# Patient Record
Sex: MALE | Race: WHITE | Employment: FULL TIME | URBAN - NONMETROPOLITAN AREA
[De-identification: names, ages, dates, MRNs, and addresses within clinical notes are randomized per-mention and may not be internally consistent; named-entity substitution may affect disease eponyms.]

---

## 2019-04-03 ENCOUNTER — HOSPITAL ENCOUNTER (OUTPATIENT)
Dept: PHYSICAL THERAPY | Age: 29
Setting detail: THERAPIES SERIES
Discharge: HOME | End: 2019-04-03
Payer: COMMERCIAL

## 2019-04-03 PROCEDURE — 97161 PT EVAL LOW COMPLEX 20 MIN: CPT

## 2019-04-03 PROCEDURE — 97110 THERAPEUTIC EXERCISES: CPT

## 2019-04-03 ASSESSMENT — PAIN SCALES - GENERAL: PAINLEVEL_OUTOF10: 5

## 2019-04-03 ASSESSMENT — PAIN DESCRIPTION - FREQUENCY: FREQUENCY: CONTINUOUS

## 2019-04-03 ASSESSMENT — PAIN DESCRIPTION - PROGRESSION: CLINICAL_PROGRESSION: GRADUALLY IMPROVING

## 2019-04-03 ASSESSMENT — PAIN DESCRIPTION - ORIENTATION: ORIENTATION: LEFT

## 2019-04-03 ASSESSMENT — PAIN DESCRIPTION - LOCATION: LOCATION: SHOULDER

## 2019-04-03 ASSESSMENT — PAIN DESCRIPTION - DESCRIPTORS: DESCRIPTORS: BURNING;ACHING;RADIATING

## 2019-04-03 ASSESSMENT — PAIN DESCRIPTION - PAIN TYPE: TYPE: ACUTE PAIN

## 2019-04-03 NOTE — FLOWSHEET NOTE
No  Comments: Scheduled to follow up on 4/15/19     Subjective: Constant pain in left shoulder starting on 3/23/19 after he tripped and fell over a box. He was on light duty already for left shoulder pain and the fall just magnified it. He fell with his left arm flexed up at shoulder and elbow. Since the fall the pain has been constant with varying intensity; x-rays were taken on 3/24/19 at [facility] when he woke up and was barely able to lift or move his arm. No referral to orthopedics at this time; being manged by occupational med clinic at work.        Pain:  Patient Currently in Pain: Yes  Pain Assessment: 0-10  Pain Level: 5  Patient's Stated Pain Goal: No pain  Pain Type: Acute pain  Pain Location: Shoulder  Pain Orientation: Left  Pain Radiating Towards: Can radiate down to elbow, but depends on the activity; describes it as burning  Pain Descriptors: Burning, Aching, Radiating  Pain Frequency: Continuous  Clinical Progression: Gradually improving  Non-Pharmaceutical Pain Intervention(s): Cold applied, Heat applied     Social/Functional History:    Lives With: Spouse  Type of Home: House  Home Layout: One level  Home Access: Stairs to enter with rails  Entrance Stairs - Number of Steps: 2             ADL Assistance: Independent     Ambulation Assistance: Independent  Transfer Assistance: Independent    Active : Yes  Mode of Transportation: Car  Occupation: Workers comp  Type of occupation: [employer]: normally an [employer] , 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 See history section above for details. Examination: Body structures and functions, activity limitations, participation restrictions; using standardized tests and measures - Low Complexity: 1-2 body structures and functional, activity limitation and/or participation restrictions. See restrictions and objective section above for details. Clinical Presentation: Low - Stable and Uncomplicated: presenting with clinical signs/symptoms consistent with shoulder sprain/strain without complication of neck pain or radicular symptoms     Decision Making: Low Complexity due to prior history of left AC joint pain/injury further contributing to current pain complaint. Decision making was based on patient assessment and decision making process in determining plan of care and establishing reasonable expectations for measurable functional outcomes. Evaluation Complexity: Based on the findings of patient history, examination, clinical presentation, and decision making during this evaluation, the evaluation of Raul Potter  is of low complexity. Goals:  Patient goals : Resolve left shoulder pain; return to work without restrictions     Short term goals  Time Frame for Short term goals: 4 weeks   Short term goal 1: Alonzo Macias will demonstrate 160 degrees left shoulder flexion without pain or compenstory shoulder hike or trunk lean allowing greater ease with donning/doffing shirts overhead  Short term goal 2: Alonzo Macias will report minimal to no pain allowing him to return to sleeping on his left shoulder without waking due to pain. Short term goal 3: Improve scapular strength/stabilization to minimize impingement at the [facility] joint on his left side.      Long term goals  Time Frame for Long term goals : 8 weeks   Long term goal 1: Return to work without restrictions/limitations   Long term goal 2: Discharge with independent [facility]   Long term goal 3: Full functional ROM without pain or limitation thereby allowing greater ease with all dressing and overhead activities.      Bryant Bennett, PT

## 2019-04-05 ENCOUNTER — HOSPITAL ENCOUNTER (OUTPATIENT)
Dept: PHYSICAL THERAPY | Age: 29
Setting detail: THERAPIES SERIES
Discharge: HOME | End: 2019-04-05
Payer: COMMERCIAL

## 2019-04-05 PROCEDURE — 97110 THERAPEUTIC EXERCISES: CPT

## 2019-04-05 ASSESSMENT — PAIN DESCRIPTION - PAIN TYPE: TYPE: ACUTE PAIN

## 2019-04-05 ASSESSMENT — PAIN DESCRIPTION - ORIENTATION: ORIENTATION: LEFT

## 2019-04-05 ASSESSMENT — PAIN DESCRIPTION - LOCATION: LOCATION: SHOULDER

## 2019-04-05 ASSESSMENT — PAIN SCALES - GENERAL: PAINLEVEL_OUTOF10: 3

## 2019-04-05 ASSESSMENT — PAIN DESCRIPTION - PROGRESSION: CLINICAL_PROGRESSION: GRADUALLY IMPROVING

## 2019-04-05 NOTE — PROGRESS NOTES
joint x10 mins seated in chair   Exercise 8: Next time - ultrasound to L AC joint - did not perform today because tape still in place and giving relief. Exercise 11: As tolerated - Pulleys, Isometric strength against wall, Prone scapular ex's  off edge of table - extension, row, horiz abd          Activity Tolerance:  Activity Tolerance: Patient Tolerated treatment well  Activity Tolerance: Pain increased to 5/10, improved with ice at end    Assessment: Body structures, Functions, Activity limitations: Decreased functional mobility , Decreased strength, Decreased endurance, Decreased ROM, Increased Pain  Assessment: Initiated AAROM and PROM program today, painful with use of cane. Patient motivated and compliant. Prognosis: Good  Discharge Recommendations: Continue to assess pending progress    Patient Education:  Patient Education: Continue to wear Rock tape until it falls off or becomes irritated. New HEP handout given - scap retraction, scap retro rolls, Tabletop stretch flexion and abduction, IR stretch behind back with strap, and posterior capsule stretch.          Plan:  Times per week: 2x per week   Plan weeks: 8 weeks   Specific instructions for Next Treatment: Progress scapular stabilization program; consider US over [facility] joint   Current Treatment Recommendations: Strengthening, ROM, Neuromuscular Re-education, Manual Therapy - Joint Manipulation, Manual Therapy - Soft Tissue Mobilization, Pain Management, Patient/Caregiver Education & Training, Modalities, Home Exercise Program    Goals:  Patient goals : Resolve left shoulder pain; return to work without restrictions     Short term goals  Time Frame for Short term goals: 4 weeks   Short term goal 1: Deedee Erickson will demonstrate 160 degrees left shoulder flexion without pain or compenstory shoulder hike or trunk lean allowing greater ease with donning/doffing shirts overhead  Short term goal 2: Deedee Erickson will report minimal to no pain allowing him to return to sleeping on his left shoulder without waking due to pain. Short term goal 3: Improve scapular strength/stabilization to minimize impingement at the [facility] joint on his left side. Long term goals  Time Frame for Long term goals : 8 weeks   Long term goal 1: Return to work without restrictions/limitations   Long term goal 2: Discharge with independent HEP   Long term goal 3: Full functional ROM without pain or limitation thereby allowing greater ease with all dressing and overhead activities.      Jose Luis العراقي, PT

## 2019-04-11 ENCOUNTER — HOSPITAL ENCOUNTER (OUTPATIENT)
Dept: PHYSICAL THERAPY | Age: 29
Setting detail: THERAPIES SERIES
Discharge: HOME | End: 2019-04-11
Payer: COMMERCIAL

## 2019-04-11 PROCEDURE — 97110 THERAPEUTIC EXERCISES: CPT

## 2019-04-11 PROCEDURE — 97035 APP MDLTY 1+ULTRASOUND EA 15: CPT

## 2019-04-11 NOTE — PROGRESS NOTES
[facility] 60  OUTPATIENT PHYSICAL THERAPY  DAILY NOTE  Bert Sahu     Time In: 994  Time Out: 1100  Minutes: 30  Timed Code Treatment Minutes: 30 Minutes             Date: 2019  Patient Name: Ralph Green,  Gender:  male        CSN: [id number]   : 1990  (29 y.o.)  Referral Date : 19    Referring Practitioner: Jose Cisse PA-C      Treatment Diagnosis: left shoulder pain; postural dysfunction    Additional Pertinent Hx: nothing noted                   General:  PT Visit Information  Onset Date: 19  PT Insurance Information: Workmans Comp/[employer]; aquatic therapy not covered; Ionto not covered; Total # of Visits Approved: 8  Total # of Visits to Date: 3  Plan of Care/Certification Expiration Date: 19               Subjective:  Chart Reviewed: Yes     Subjective: Reports neck stiffness in the mornings, but able to work it out with stretching     Pain:  Patient Currently in Pain: No         Objective                                                                                                                          Exercises  Exercise 1: Scapular squeezes: 10x, scapular retro rolls x10 reps   Exercise 2: Pendulum exercises circles, forward x10   Exercise 3: wall walk x 3 x 15 sec   Exercise 4: Seated tabletop stretches x10 each - forward, abduction   Exercise 5: Seated upper trap stretch, IR stretch behind back with towel , posterior capsule stretch L shoulder 3x 30 sec   Exercise 8:  ultrasound to L AC joint - 100% 1.0w/cm2 x 8 min         Activity Tolerance:  Activity Tolerance: Patient Tolerated treatment well    Assessment:   Body structures, Functions, Activity limitations: Decreased functional mobility , Decreased strength, Decreased endurance, Decreased ROM, Increased Pain  Prognosis: Good  Discharge Recommendations: Continue to assess pending progress    Patient Education:  Patient Education: ther ex                       Plan:  Times per week: 2x per week   Plan weeks: 8 weeks   Specific instructions for Next Treatment: Progress scapular stabilization program; consider US over [facility] joint   Current Treatment Recommendations: Strengthening, ROM, Neuromuscular Re-education, Manual Therapy - Joint Manipulation, Manual Therapy - Soft Tissue Mobilization, Pain Management, Patient/Caregiver Education & Training, Modalities, Home Exercise Program    Goals:  Patient goals : Resolve left shoulder pain; return to work without restrictions     Short term goals  Time Frame for Short term goals: 4 weeks   Short term goal 1: Neema Wen will demonstrate 160 degrees left shoulder flexion without pain or compenstory shoulder hike or trunk lean allowing greater ease with donning/doffing shirts overhead  Short term goal 2: Neema Wen will report minimal to no pain allowing him to return to sleeping on his left shoulder without waking due to pain. Short term goal 3: Improve scapular strength/stabilization to minimize impingement at the [facility] joint on his left side. Long term goals  Long term goal 1: Return to work without restrictions/limitations   Long term goal 2: Discharge with independent [facility]   Long term goal 3: Full functional ROM without pain or limitation thereby allowing greater ease with all dressing and overhead activities.      Bretta Scales  [id number]

## 2019-04-12 ENCOUNTER — HOSPITAL ENCOUNTER (OUTPATIENT)
Dept: PHYSICAL THERAPY | Age: 29
Setting detail: THERAPIES SERIES
Discharge: HOME | End: 2019-04-12
Payer: COMMERCIAL

## 2019-04-12 PROCEDURE — 97035 APP MDLTY 1+ULTRASOUND EA 15: CPT

## 2019-04-12 PROCEDURE — 97110 THERAPEUTIC EXERCISES: CPT

## 2019-04-12 ASSESSMENT — PAIN SCALES - GENERAL: PAINLEVEL_OUTOF10: 2

## 2019-04-17 ENCOUNTER — HOSPITAL ENCOUNTER (OUTPATIENT)
Dept: PHYSICAL THERAPY | Age: 29
Setting detail: THERAPIES SERIES
Discharge: HOME | End: 2019-04-17
Payer: COMMERCIAL

## 2019-04-17 PROCEDURE — 97035 APP MDLTY 1+ULTRASOUND EA 15: CPT

## 2019-04-17 PROCEDURE — 97110 THERAPEUTIC EXERCISES: CPT

## 2019-04-17 ASSESSMENT — PAIN DESCRIPTION - ORIENTATION: ORIENTATION: LEFT

## 2019-04-17 ASSESSMENT — PAIN DESCRIPTION - PAIN TYPE: TYPE: ACUTE PAIN

## 2019-04-17 ASSESSMENT — PAIN SCALES - GENERAL: PAINLEVEL_OUTOF10: 2

## 2019-04-17 ASSESSMENT — PAIN DESCRIPTION - LOCATION: LOCATION: SHOULDER

## 2019-04-17 NOTE — PROGRESS NOTES
[facility] 60  OUTPATIENT PHYSICAL THERAPY  DAILY NOTE  Thea Mcintosh     Time In: 1300  Time Out: 1065  Minutes: 45  Timed Code Treatment Minutes: 45 Minutes             Date: 2019  Patient Name: Hamida Mccrary,  Gender:  male        CSN: [id number]   : 1990  (29 y.o.)  Referral Date : 19    Referring Practitioner: Rachel Shaw PA-C      Diagnosis: Left shoulder sprain   Treatment Diagnosis: left shoulder pain; postural dysfunction    Additional Pertinent Hx: nothing noted                   General:  PT Visit Information  Onset Date: 19  PT Insurance Information: Workmans Comp/[employer]; aquatic therapy not covered; Ionto not covered; Total # of Visits Approved: 8  Total # of Visits to Date: 5  Plan of Care/Certification Expiration Date: 19               Subjective:  Chart Reviewed: Yes  Response To Previous Treatment: Not applicable     Subjective: Patient has been feeling better since starting therapy. Pain very minimal today. He is on light duty for 2 more weeks at work.       Pain:  Patient Currently in Pain: Yes  Pain Assessment: 0-10  Pain Level: 2  Pain Type: Acute pain  Pain Location: Shoulder  Pain Orientation: Left      Objective    Exercises  Exercise 3: wall walk x 3 x 15 sec, ER stretch at wall 3x 15 sec   Exercise 4: Seated tabletop stretches x10 each - forward, abduction   Exercise 5: Seated upper trap stretch, IR stretch behind back with towel , posterior capsule stretch L shoulder 3x 30 sec   Exercise 6: pulleys flex, abd x 20 ea way   Exercise 8: ultrasound to L AC joint - 100% 1.0w/cm2, 1.0 MHz x 8 min   Exercise 9: Scapular theraband Orange - B rows, extension, L shoulder ER, IR x15 reps   Exercise 10: Prone L shoulder scapular strength 2# row, extension x15 reps, and 0 weight horiz abd and scaption x15  Exercise 11: Supine B horiz add, L shoulder protraction, flexion to 90 deg 2# weight x15 reps          Activity Tolerance:  Activity Tolerance: Patient Tolerated treatment well    Assessment: Body structures, Functions, Activity limitations: Decreased functional mobility , Decreased strength, Decreased endurance, Decreased ROM, Increased Pain  Assessment: Progressed strengthening today and AROM for scapular and glenoid mobility. Denies increased pain today. Prognosis: Good       Patient Education:  Patient Education: Monitor pain after strengthening. Continue wall slides. Plan:  Times per week: 2x per week   Plan weeks: 8 weeks   Specific instructions for Next Treatment: Progress scapular stabilization program; consider US over [facility] joint   Current Treatment Recommendations: Strengthening, ROM, Neuromuscular Re-education, Manual Therapy - Joint Manipulation, Manual Therapy - Soft Tissue Mobilization, Pain Management, Patient/Caregiver Education & Training, Modalities, Home Exercise Program    Goals:  Patient goals : Resolve left shoulder pain; return to work without restrictions     Short term goals  Time Frame for Short term goals: 4 weeks   Short term goal 1: Reyna Estrada will demonstrate 160 degrees left shoulder flexion without pain or compenstory shoulder hike or trunk lean allowing greater ease with donning/doffing shirts overhead  Short term goal 2: Reyna Estrada will report minimal to no pain allowing him to return to sleeping on his left shoulder without waking due to pain. Short term goal 3: Improve scapular strength/stabilization to minimize impingement at the [facility] joint on his left side. Long term goals  Time Frame for Long term goals : 8 weeks   Long term goal 1: Return to work without restrictions/limitations   Long term goal 2: Discharge with independent HEP   Long term goal 3: Full functional ROM without pain or limitation thereby allowing greater ease with all dressing and overhead activities.      Ruchi Mackey PT

## 2019-04-19 ENCOUNTER — HOSPITAL ENCOUNTER (OUTPATIENT)
Dept: PHYSICAL THERAPY | Age: 29
Setting detail: THERAPIES SERIES
Discharge: HOME | End: 2019-04-19
Payer: COMMERCIAL

## 2019-04-19 PROCEDURE — 97110 THERAPEUTIC EXERCISES: CPT

## 2019-04-19 PROCEDURE — 97035 APP MDLTY 1+ULTRASOUND EA 15: CPT

## 2019-04-19 NOTE — PROGRESS NOTES
[facility] 60  OUTPATIENT PHYSICAL THERAPY  DAILY NOTE  Marcyshefali Lerner     Time In: 4464  Time Out: 7711  Minutes: 40  Timed Code Treatment Minutes: 40 Minutes             Date: 2019  Patient Name: Ana Mcfarlane,  Gender:  male        CSN: [id number]   : 1990  (29 y.o.)  Referral Date : 19    Referring Practitioner: Destinee Young PA-C      Diagnosis: Left shoulder sprain   Treatment Diagnosis: left shoulder pain; postural dysfunction    Additional Pertinent Hx: nothing noted                   General:  PT Visit Information  Onset Date: 19  PT Insurance Information: Workmans [employer]; aquatic therapy not covered; Ionto not covered;    Total # of Visits Approved: 8  Total # of Visits to Date: 6  Plan of Care/Certification Expiration Date: 19  Progress Note Counter:                Subjective:  Chart Reviewed: Yes  Response To Previous Treatment: Not applicable  Family / Caregiver Present: Yes     Subjective: No pain or problems to report     Pain:  Patient Currently in Pain: No         Objective                                                                                                                          Exercises  Exercise 3: wall walk x 3 x 15 sec, ER stretch at wall 3x 15 sec   Exercise 4: Seated tabletop stretches x10 each - forward, abduction   Exercise 5: Seated upper trap stretch, IR stretch behind back with towel , posterior capsule stretch L shoulder 3x 30 sec   Exercise 6: pulleys flex, abd x 20 ea way   Exercise 8: ultrasound to L AC joint - 100% 1.0w/cm2, 1.0 MHz x 8 min   Exercise 9: Scapular theraband Orange - B rows, extension, L shoulder ER, IR x15 reps   Exercise 10: Prone L shoulder scapular strength 2# row, extension x15 reps, and 0 weight horiz abd and scaption x15  Exercise 11: Supine B horiz add, L shoulder protraction, flexion to 90 deg 2# weight x15 reps          Activity Tolerance:  Activity Tolerance: Patient Tolerated treatment well    Assessment: Body structures, Functions, Activity limitations: Decreased functional mobility , Decreased strength, Decreased endurance, Decreased ROM, Increased Pain  Prognosis: Good  Discharge Recommendations: Continue to assess pending progress    Patient Education:  Patient Education: Monitor pain after strengthening. Continue wall slides. Plan:  Times per week: 2x per week   Plan weeks: 8 weeks   Specific instructions for Next Treatment: Progress scapular stabilization program; consider US over [facility] joint   Current Treatment Recommendations: Strengthening, ROM, Neuromuscular Re-education, Manual Therapy - Joint Manipulation, Manual Therapy - Soft Tissue Mobilization, Pain Management, Patient/Caregiver Education & Training, Modalities, Home Exercise Program    Goals:  Patient goals : Resolve left shoulder pain; return to work without restrictions     Short term goals  Time Frame for Short term goals: 4 weeks   Short term goal 1: Zuleyma Talbot will demonstrate 160 degrees left shoulder flexion without pain or compenstory shoulder hike or trunk lean allowing greater ease with donning/doffing shirts overhead  Short term goal 2: Zuleyma Talbot will report minimal to no pain allowing him to return to sleeping on his left shoulder without waking due to pain. Short term goal 3: Improve scapular strength/stabilization to minimize impingement at the [facility] joint on his left side. Long term goals  Time Frame for Long term goals : 8 weeks   Long term goal 1: Return to work without restrictions/limitations   Long term goal 2: Discharge with independent HEP   Long term goal 3: Full functional ROM without pain or limitation thereby allowing greater ease with all dressing and overhead activities.      Deepa Quijano  [id number]

## 2019-04-26 ENCOUNTER — HOSPITAL ENCOUNTER (OUTPATIENT)
Dept: PHYSICAL THERAPY | Age: 29
Setting detail: THERAPIES SERIES
Discharge: HOME | End: 2019-04-26
Payer: COMMERCIAL

## 2019-04-26 PROCEDURE — 97035 APP MDLTY 1+ULTRASOUND EA 15: CPT

## 2019-04-26 PROCEDURE — 97110 THERAPEUTIC EXERCISES: CPT

## 2019-05-01 ENCOUNTER — HOSPITAL ENCOUNTER (OUTPATIENT)
Dept: PHYSICAL THERAPY | Age: 29
Setting detail: THERAPIES SERIES
Discharge: HOME | End: 2019-05-01
Payer: COMMERCIAL

## 2019-05-01 PROCEDURE — 97110 THERAPEUTIC EXERCISES: CPT

## 2019-05-01 NOTE — DISCHARGE SUMMARY
[facility] 60  OUTPATIENT PHYSICAL THERAPY  DISCHARGE NOTE  Jennifer Coe     Time In: 0900  Time Out: 0930  Minutes: 30  Timed Code Treatment Minutes: 30 Minutes       Date: 2019  Patient Name: Juan C Marcano,  Gender:  male        CSN: [id number]   : 1990  (29 y.o.)  Referral Date : 19    Referring Practitioner: Joaquín Cornell PA-C      Diagnosis: Left shoulder sprain   Treatment Diagnosis: left shoulder pain; postural dysfunction            General:  PT Visit Information  Onset Date: 19  PT Insurance Information: Work[employer]; aquatic therapy not covered; Ionto not covered; Total # of Visits Approved: 8  Total # of Visits to Date: 8               Subjective:  Chart Reviewed: Yes     Subjective: Patient saw physician Monday and was released to work, worked yesterday with only mild fatigue and soreness, no significant pain this morning. He feels much improved, is consistent with home stretches. He still notes some difficulty with pulling items across the front of his body at work due to shoulder weakness. Pain:  Patient Currently in Pain: No         Objective    Exercises  Exercise 3: wall walk x 3 x 15 sec, ER stretch at wall 3x 15 sec   Exercise 5: Seated upper trap stretch, IR stretch behind back with towel , posterior capsule stretch L shoulder 3x 30 sec   Exercise 6: pulleys flex, abd x 20 ea way   Exercise 9: Scapular theraband Green - B rows, flexion, extension, L shoulder ER, IR x15 reps - Given for HEP  Exercise 12: Reassessment 19 - AROM shoulder flexion 0-180 deg, abduction 0-170 deg, IR behind back to T9         Activity Tolerance:  Activity Tolerance: Patient Tolerated treatment well    Assessment: Body structures, Functions, Activity limitations: Decreased functional mobility , Decreased strength, Decreased endurance, Decreased ROM, Increased Pain  Assessment: Patient has made excellent progress toward therapy goals.  He demonstrates flexion, 170 deg abduction, horiz adduction across chest, ER behind head and IR behind back no pain    Romaine Nichols, PT

## 2023-01-10 ENCOUNTER — OFFICE VISIT (OUTPATIENT)
Dept: PRIMARY CARE CLINIC | Age: 33
End: 2023-01-10

## 2023-01-10 VITALS
BODY MASS INDEX: 39.17 KG/M2 | WEIGHT: 315 LBS | HEIGHT: 75 IN | RESPIRATION RATE: 18 BRPM | DIASTOLIC BLOOD PRESSURE: 78 MMHG | SYSTOLIC BLOOD PRESSURE: 120 MMHG | OXYGEN SATURATION: 99 % | HEART RATE: 68 BPM

## 2023-01-10 DIAGNOSIS — S90.122A CONTUSION OF FOURTH TOE OF LEFT FOOT, INITIAL ENCOUNTER: ICD-10-CM

## 2023-01-10 DIAGNOSIS — S90.122A CONTUSION OF THIRD TOE OF LEFT FOOT, INITIAL ENCOUNTER: ICD-10-CM

## 2023-01-10 DIAGNOSIS — S99.922A TOE TRAUMA, LEFT, INITIAL ENCOUNTER: Primary | ICD-10-CM

## 2023-01-10 ASSESSMENT — ENCOUNTER SYMPTOMS: COLOR CHANGE: 1

## 2023-01-10 NOTE — PATIENT INSTRUCTIONS
May moustapha tape 3/4 toes together  If worse seek imaging  Hard surface shoe at home if able. Ice .  Otc pain relievers and elevate when able

## 2023-01-10 NOTE — PROGRESS NOTES
[address]  Dept: [phone]  Dept Fax: : [phone]  Loc Fax: [phone]    Karla Escobedo is a 28 y.o. male who presents today for his medical conditions/complaints as noted below. Chief Complaint   Patient presents with    Toe Injury     Stubbed his toe on left foot at 2am on a door, 4th toe            HPI:     Andrea Arriaza is here for left toe pain  He awoke last night at 2:00 in the morning and struck the left foot on edge of door while ambulating to bathroom. States 4th digit was crooked pointing to left, he states that he grasped it and put back into place  Slight limp with ambulation and bruising  He is taking otc pain meds  Wearing tennis shoe today    Toe Pain   The incident occurred 6 to 12 hours ago. The incident occurred at home. The injury mechanism was a direct blow. The pain is present in the left toes. The quality of the pain is described as aching. The pain is at a severity of 5/10. The pain is moderate. The pain has been Constant since onset. Associated symptoms include a loss of motion. Pertinent negatives include no inability to bear weight, loss of sensation, muscle weakness, numbness or tingling. He reports no foreign bodies present. The symptoms are aggravated by movement, palpation and weight bearing. He has tried acetaminophen and NSAIDs for the symptoms. The treatment provided mild relief. Current Outpatient Medications   Medication Sig Dispense Refill    ibuprofen (IBU) 600 MG tablet Take 1 tablet by mouth every 6 hours as needed for Pain. 20 tablet 0     No current facility-administered medications for this visit. No Known Allergies    Subjective:      Review of Systems   Constitutional: Negative. Musculoskeletal:  Positive for arthralgias, gait problem, joint swelling and myalgias. Skin:  Positive for color change and wound. Negative for pallor and rash. Neurological:  Negative for dizziness, tingling, weakness and numbness. Objective:     /78 (Site: Left Upper Arm, Position: Sitting, Cuff Size: Large Adult)   Pulse 68   Resp 18   Ht 6' 3\" (1.905 m)   Wt (!) 322 lb (146.1 kg)   SpO2 99%   BMI 40.25 kg/m²     Physical Exam  Constitutional:       Appearance: Normal appearance. He is obese. Cardiovascular:      Pulses:           Dorsalis pedis pulses are 2+ on the left side. Posterior tibial pulses are 2+ on the left side. Musculoskeletal:         General: Swelling, tenderness and signs of injury present. Right lower leg: No edema. Left lower leg: No edema. Left foot: Decreased range of motion. No deformity or prominent metatarsal heads. Feet:    Feet:      Left foot:      Protective Sensation: 3 sites tested. 3 sites sensed. Skin integrity: No ulcer, blister, skin breakdown, erythema or warmth. Comments: Ecchymosis  present 2-4th toes, reduced flexion ability of 4th toe left foot  Skin:     General: Skin is warm and dry. Findings: Bruising present. Neurological:      Mental Status: He is alert. Assessment:      1. Toe trauma, left, initial encounter    - XR FOOT LEFT (MIN 3 VIEWS); Future    2. Contusion of third toe of left foot, initial encounter    3. Contusion of fourth toe of left foot, initial encounter      Plan:   contsusion and possible fx 3-4 digits of left foot, no mid foot pain, + for pain over metatarsal heads 3-4 digits  with palpation    No loss of sensation proximal or distal to injury, good cap refill and sensory testing    He does demonstrate:  Limited toe flexion ability of 4th toe proximal to distal phalange   Positive for Bruising noted to middle and 4th toe and tip of second toe with small abrasion on tip of toe.  Superficial, no drainage    We discussed care, moustapha tape, imaging, hard surface shoe  Ice, compression and elevation      Discussed use, benefit, and side effects of prescribed medications. Barriers to medication compliance addressed. All patient questions answered. Pt voiced understanding. Return if symptoms worsen or fail to improve. Orders Placed This Encounter   Procedures    XR FOOT LEFT (MIN 3 VIEWS)     Standing Status:   Future     Standing Expiration Date:   1/10/2024     Order Specific Question:   Reason for exam:     Answer:   contsusion and possible fx 3-4 digits of left foot, no mid foot pain, pin over metatarsal heads 3-4 digit     No orders of the defined types were placed in this encounter.           Electronically signed by BRITTA Webb CNP on 1/10/2023 at 11:55 AM

## 2023-11-18 ENCOUNTER — HOSPITAL ENCOUNTER (EMERGENCY)
Age: 33
Discharge: HOME | End: 2023-11-18
Payer: COMMERCIAL

## 2023-11-18 VITALS
DIASTOLIC BLOOD PRESSURE: 92 MMHG | RESPIRATION RATE: 16 BRPM | TEMPERATURE: 98.2 F | WEIGHT: 310 LBS | OXYGEN SATURATION: 97 % | HEIGHT: 75 IN | HEART RATE: 76 BPM | BODY MASS INDEX: 38.54 KG/M2 | SYSTOLIC BLOOD PRESSURE: 147 MMHG

## 2023-11-18 DIAGNOSIS — S61.215A LACERATION OF LEFT RING FINGER WITHOUT FOREIGN BODY WITHOUT DAMAGE TO NAIL, INITIAL ENCOUNTER: Primary | ICD-10-CM

## 2023-11-18 PROCEDURE — 99203 OFFICE O/P NEW LOW 30 MIN: CPT

## 2023-11-18 PROCEDURE — 12001 RPR S/N/AX/GEN/TRNK 2.5CM/<: CPT | Performed by: NURSE PRACTITIONER

## 2023-11-18 RX ORDER — ACETAMINOPHEN 650 MG
TABLET, EXTENDED RELEASE ORAL PRN
Status: DISCONTINUED | OUTPATIENT
Start: 2023-11-18 | End: 2023-11-18 | Stop reason: HOSPADM

## 2023-11-18 RX ORDER — LIDOCAINE HYDROCHLORIDE 20 MG/ML
5 INJECTION, SOLUTION INFILTRATION; PERINEURAL ONCE
Status: DISCONTINUED | OUTPATIENT
Start: 2023-11-18 | End: 2023-11-18 | Stop reason: HOSPADM

## 2023-11-18 ASSESSMENT — ENCOUNTER SYMPTOMS
COLOR CHANGE: 0
COUGH: 0
APNEA: 0
CHEST TIGHTNESS: 0
SHORTNESS OF BREATH: 0
STRIDOR: 0
WHEEZING: 0
CHOKING: 0

## 2023-11-18 ASSESSMENT — PAIN DESCRIPTION - LOCATION: LOCATION: FINGER (COMMENT WHICH ONE)

## 2023-11-18 ASSESSMENT — PAIN - FUNCTIONAL ASSESSMENT: PAIN_FUNCTIONAL_ASSESSMENT: 0-10

## 2023-11-18 ASSESSMENT — PAIN SCALES - GENERAL: PAINLEVEL_OUTOF10: 3

## 2023-11-18 ASSESSMENT — PAIN DESCRIPTION - ORIENTATION: ORIENTATION: LEFT

## 2023-11-18 NOTE — DISCHARGE INSTRUCTIONS
Monitor for redness, drainage, pain   Keep clean and dry  Sutures out in 10-14 days  Follow up with your PCP or return for any concerns   or go to the Emergency Department

## 2023-11-19 NOTE — ED PROVIDER NOTES
[address]  Urgent Care Encounter      CHIEF COMPLAINT       Chief Complaint   Patient presents with    Laceration     LEFT RING FINGER    Fall     SLIPPED AND FELL AT HOME  DENIES HITTING HEAD  \" I MAY HAVE WHILPASH\"       Nurses Notes reviewed and I agree except as noted in the HPI. HISTORY OFPRESENT ILLNESS   Mell Conway is a 35 y.o. The history is provided by the patient and the spouse. No  was used. Laceration  Location:  Finger  Finger laceration location:  L ring finger  Length:  2  Depth: Through dermis  Quality: jagged    Bleeding: controlled with pressure    Laceration mechanism:  Unable to specify  Pain details:     Quality:  Unable to specify    Severity:  Mild    Timing:  Constant    Progression:  Unchanged  Foreign body present:  No foreign bodies  Relieved by:  Pressure  Worsened by: Movement  Ineffective treatments:  Certain positions  Tetanus status:  Up to date  Associated symptoms: no fever, no focal weakness, no numbness, no rash, no redness, no swelling and no streaking        REVIEW OF SYSTEMS     Review of Systems   Constitutional:  Negative for activity change, appetite change, chills, diaphoresis, fatigue and fever. Respiratory:  Negative for apnea, cough, choking, chest tightness, shortness of breath, wheezing and stridor. Cardiovascular:  Negative for chest pain, palpitations and leg swelling. Skin:  Positive for wound. Negative for color change, pallor and rash. Neurological:  Negative for focal weakness. PAST MEDICAL HISTORY   History reviewed. No pertinent past medical history. SURGICAL HISTORY     Patient  has a past surgical history that includes shoulder surgery (Left) and Abdomen surgery. CURRENT MEDICATIONS       Discharge Medication List as of 11/18/2023  7:00 PM          ALLERGIES     Patient is has No Known Allergies.     FAMILY HISTORY     Patient's family history includes Cancer in his father; High

## 2025-06-01 ENCOUNTER — HOSPITAL ENCOUNTER (EMERGENCY)
Age: 35
Discharge: HOME | End: 2025-06-02
Attending: EMERGENCY MEDICINE
Payer: COMMERCIAL

## 2025-06-01 DIAGNOSIS — R07.9 CHEST PAIN, UNSPECIFIED TYPE: Primary | ICD-10-CM

## 2025-06-01 PROCEDURE — 93005 ELECTROCARDIOGRAM TRACING: CPT | Performed by: EMERGENCY MEDICINE

## 2025-06-01 PROCEDURE — 85025 COMPLETE CBC W/AUTO DIFF WBC: CPT

## 2025-06-01 PROCEDURE — 83880 ASSAY OF NATRIURETIC PEPTIDE: CPT

## 2025-06-01 PROCEDURE — 84484 ASSAY OF TROPONIN QUANT: CPT

## 2025-06-01 PROCEDURE — 36415 COLL VENOUS BLD VENIPUNCTURE: CPT

## 2025-06-01 PROCEDURE — 99285 EMERGENCY DEPT VISIT HI MDM: CPT

## 2025-06-01 PROCEDURE — 80048 BASIC METABOLIC PNL TOTAL CA: CPT

## 2025-06-01 RX ORDER — KETOROLAC TROMETHAMINE 30 MG/ML
30 INJECTION, SOLUTION INTRAMUSCULAR; INTRAVENOUS ONCE
Status: COMPLETED | OUTPATIENT
Start: 2025-06-02 | End: 2025-06-02

## 2025-06-01 RX ORDER — ASPIRIN 81 MG/1
324 TABLET, CHEWABLE ORAL ONCE
Status: COMPLETED | OUTPATIENT
Start: 2025-06-02 | End: 2025-06-02

## 2025-06-01 ASSESSMENT — PAIN SCALES - GENERAL: PAINLEVEL_OUTOF10: 8

## 2025-06-01 ASSESSMENT — PAIN - FUNCTIONAL ASSESSMENT: PAIN_FUNCTIONAL_ASSESSMENT: 0-10

## 2025-06-02 ENCOUNTER — APPOINTMENT (OUTPATIENT)
Dept: GENERAL RADIOLOGY | Age: 35
End: 2025-06-02
Payer: COMMERCIAL

## 2025-06-02 VITALS
HEART RATE: 64 BPM | TEMPERATURE: 97.4 F | SYSTOLIC BLOOD PRESSURE: 120 MMHG | HEIGHT: 75 IN | OXYGEN SATURATION: 96 % | RESPIRATION RATE: 16 BRPM | BODY MASS INDEX: 37.3 KG/M2 | DIASTOLIC BLOOD PRESSURE: 79 MMHG | WEIGHT: 300 LBS

## 2025-06-02 LAB
ANION GAP SERPL CALC-SCNC: 13 MEQ/L (ref 8–16)
BASOPHILS ABSOLUTE: 0.1 THOU/MM3 (ref 0–0.1)
BASOPHILS NFR BLD AUTO: 0.5 %
BUN SERPL-MCNC: 12 MG/DL (ref 8–23)
CALCIUM SERPL-MCNC: 9.1 MG/DL (ref 8.6–10)
CHLORIDE SERPL-SCNC: 103 MEQ/L (ref 98–111)
CO2 SERPL-SCNC: 23 MEQ/L (ref 22–29)
CREAT SERPL-MCNC: 0.6 MG/DL (ref 0.7–1.2)
DEPRECATED RDW RBC AUTO: 43.6 FL (ref 35–45)
EKG ATRIAL RATE: 59 BPM
EKG P AXIS: 25 DEGREES
EKG P-R INTERVAL: 166 MS
EKG Q-T INTERVAL: 412 MS
EKG QRS DURATION: 108 MS
EKG QTC CALCULATION (BAZETT): 407 MS
EKG R AXIS: 34 DEGREES
EKG T AXIS: 42 DEGREES
EKG VENTRICULAR RATE: 59 BPM
EOSINOPHIL NFR BLD AUTO: 4.4 %
EOSINOPHILS ABSOLUTE: 0.5 THOU/MM3 (ref 0–0.4)
ERYTHROCYTE [DISTWIDTH] IN BLOOD BY AUTOMATED COUNT: 13.2 % (ref 11.5–14.5)
GFR SERPL CREATININE-BSD FRML MDRD: > 90 ML/MIN/1.73M2
GLUCOSE SERPL-MCNC: 123 MG/DL (ref 74–109)
HCT VFR BLD AUTO: 40.5 % (ref 42–52)
HGB BLD-MCNC: 14 GM/DL (ref 14–18)
IMM GRANULOCYTES # BLD AUTO: 0.02 THOU/MM3 (ref 0–0.07)
IMM GRANULOCYTES NFR BLD AUTO: 0.2 %
LYMPHOCYTES ABSOLUTE: 5 THOU/MM3 (ref 1–4.8)
LYMPHOCYTES NFR BLD AUTO: 45 %
MCH RBC QN AUTO: 31 PG (ref 26–33)
MCHC RBC AUTO-ENTMCNC: 34.6 GM/DL (ref 32.2–35.5)
MCV RBC AUTO: 89.6 FL (ref 80–94)
MONOCYTES ABSOLUTE: 0.7 THOU/MM3 (ref 0.4–1.3)
MONOCYTES NFR BLD AUTO: 6.7 %
NEUTROPHILS ABSOLUTE: 4.8 THOU/MM3 (ref 1.8–7.7)
NEUTROPHILS NFR BLD AUTO: 43.2 %
NRBC BLD AUTO-RTO: 0 /100 WBC
NT-PROBNP SERPL IA-MCNC: 40 PG/ML (ref 0–124)
OSMOLALITY SERPL CALC.SUM OF ELEC: 278.7 MOSMOL/KG (ref 275–300)
PLATELET # BLD AUTO: 239 THOU/MM3 (ref 130–400)
PMV BLD AUTO: 9.8 FL (ref 9.4–12.4)
POTASSIUM SERPL-SCNC: 3.8 MEQ/L (ref 3.5–5.2)
RBC # BLD AUTO: 4.52 MILL/MM3 (ref 4.7–6.1)
SODIUM SERPL-SCNC: 139 MEQ/L (ref 135–145)
TROPONIN, HIGH SENSITIVITY: 6 NG/L (ref 0–12)
WBC # BLD AUTO: 11 THOU/MM3 (ref 4.8–10.8)

## 2025-06-02 PROCEDURE — 71045 X-RAY EXAM CHEST 1 VIEW: CPT

## 2025-06-02 PROCEDURE — 6360000002 HC RX W HCPCS: Performed by: EMERGENCY MEDICINE

## 2025-06-02 PROCEDURE — 96374 THER/PROPH/DIAG INJ IV PUSH: CPT

## 2025-06-02 PROCEDURE — 6370000000 HC RX 637 (ALT 250 FOR IP): Performed by: EMERGENCY MEDICINE

## 2025-06-02 RX ADMIN — KETOROLAC TROMETHAMINE 30 MG: 30 INJECTION, SOLUTION INTRAMUSCULAR at 00:12

## 2025-06-02 RX ADMIN — ASPIRIN 324 MG: 81 TABLET, CHEWABLE ORAL at 00:11

## 2025-06-02 ASSESSMENT — PAIN SCALES - GENERAL
PAINLEVEL_OUTOF10: 4
PAINLEVEL_OUTOF10: 2

## 2025-06-02 NOTE — ED TRIAGE NOTES
Pt presents to the ED with complaints of chest pain that has been going on for about an hour and a half when the pt laid down in bed. Pt states he has had tylenol since the pain started and it hasn't really helped. Pt states that he does have a history of angina and that he has some mild sob with the pain.

## 2025-06-02 NOTE — ED PROVIDER NOTES
User Index  [MM] Beto Jurado DO         CRITICAL CARE TIME   Total Critical Care time was 0 minutes, excluding separately reportable procedures.  There was a high probability of clinically significant/life threatening deterioration in the patient's condition which required my urgent intervention.       CONSULTS:  None    PROCEDURES:  Unless otherwise noted below, none     Procedures        FINAL IMPRESSION      1. Chest pain, unspecified type          DISPOSITION/PLAN   DISPOSITION Decision To Discharge 06/02/2025 12:37:09 AM   DISPOSITION CONDITION Stable           PATIENT REFERRED TO:  [facility]  [address] [place] [address]  [phone]          DISCHARGE MEDICATIONS:  New Prescriptions    No medications on file     Controlled Substances Monitoring:          No data to display                (Please note that portions of this note were completed with a voice recognition program.  Efforts were made to edit the dictations but occasionally words are mis-transcribed.)    Beto Jurado DO (electronically signed)  Attending Emergency Physician           Beto Jurado DO  06/02/25 0038